# Patient Record
Sex: MALE | Race: WHITE | NOT HISPANIC OR LATINO | Employment: STUDENT | URBAN - NONMETROPOLITAN AREA
[De-identification: names, ages, dates, MRNs, and addresses within clinical notes are randomized per-mention and may not be internally consistent; named-entity substitution may affect disease eponyms.]

---
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
## 2018-11-22 ENCOUNTER — HOSPITAL ENCOUNTER (EMERGENCY)
Facility: HOSPITAL | Age: 18
Discharge: HOME | End: 2018-11-22
Attending: STUDENT IN AN ORGANIZED HEALTH CARE EDUCATION/TRAINING PROGRAM | Admitting: STUDENT IN AN ORGANIZED HEALTH CARE EDUCATION/TRAINING PROGRAM

## 2018-11-22 ENCOUNTER — HOSPITAL ENCOUNTER (EMERGENCY)
Facility: HOSPITAL | Age: 18
Discharge: HOME | End: 2018-11-22
Attending: EMERGENCY MEDICINE | Admitting: EMERGENCY MEDICINE

## 2018-11-22 VITALS
WEIGHT: 145 LBS | RESPIRATION RATE: 18 BRPM | OXYGEN SATURATION: 99 % | HEIGHT: 67 IN | BODY MASS INDEX: 22.76 KG/M2 | DIASTOLIC BLOOD PRESSURE: 93 MMHG | HEART RATE: 101 BPM | SYSTOLIC BLOOD PRESSURE: 137 MMHG | TEMPERATURE: 98.5 F

## 2018-11-22 VITALS
OXYGEN SATURATION: 98 % | TEMPERATURE: 99.8 F | HEIGHT: 67 IN | DIASTOLIC BLOOD PRESSURE: 82 MMHG | BODY MASS INDEX: 25.26 KG/M2 | RESPIRATION RATE: 16 BRPM | WEIGHT: 160.94 LBS | SYSTOLIC BLOOD PRESSURE: 132 MMHG | HEART RATE: 96 BPM

## 2018-11-22 DIAGNOSIS — L05.91 PILONIDAL CYST: Primary | ICD-10-CM

## 2018-11-22 DIAGNOSIS — L05.01 PILONIDAL CYST WITH ABSCESS: Primary | ICD-10-CM

## 2018-11-22 LAB
ANION GAP SERPL CALCULATED.3IONS-SCNC: 6 MMOL/L (ref 3–11)
BASOPHILS # BLD AUTO: 0.02 10*3/MM3 (ref 0–0.2)
BASOPHILS NFR BLD AUTO: 0.2 % (ref 0–1)
BUN BLD-MCNC: 9 MG/DL (ref 9–23)
BUN/CREAT SERPL: 12.2 (ref 7–25)
CALCIUM SPEC-SCNC: 9.2 MG/DL (ref 8.7–10.4)
CHLORIDE SERPL-SCNC: 105 MMOL/L (ref 99–109)
CO2 SERPL-SCNC: 27 MMOL/L (ref 20–31)
CREAT BLD-MCNC: 0.74 MG/DL (ref 0.6–1.3)
DEPRECATED RDW RBC AUTO: 42.8 FL (ref 37–54)
EOSINOPHIL # BLD AUTO: 0.06 10*3/MM3 (ref 0–0.3)
EOSINOPHIL NFR BLD AUTO: 0.7 % (ref 0–3)
ERYTHROCYTE [DISTWIDTH] IN BLOOD BY AUTOMATED COUNT: 13.8 % (ref 11.3–14.5)
GFR SERPL CREATININE-BSD FRML MDRD: 135 ML/MIN/1.73
GLUCOSE BLD-MCNC: 84 MG/DL (ref 70–100)
HCT VFR BLD AUTO: 44.4 % (ref 38.9–50.9)
HGB BLD-MCNC: 14.5 G/DL (ref 13.1–17.5)
IMM GRANULOCYTES # BLD: 0.03 10*3/MM3 (ref 0–0.03)
IMM GRANULOCYTES NFR BLD: 0.4 % (ref 0–0.6)
LYMPHOCYTES # BLD AUTO: 1.23 10*3/MM3 (ref 0.6–4.8)
LYMPHOCYTES NFR BLD AUTO: 15.1 % (ref 24–44)
MCH RBC QN AUTO: 27.8 PG (ref 27–31)
MCHC RBC AUTO-ENTMCNC: 32.7 G/DL (ref 32–36)
MCV RBC AUTO: 85.1 FL (ref 80–99)
MONOCYTES # BLD AUTO: 0.76 10*3/MM3 (ref 0–1)
MONOCYTES NFR BLD AUTO: 9.3 % (ref 0–12)
NEUTROPHILS # BLD AUTO: 6.06 10*3/MM3 (ref 1.5–8.3)
NEUTROPHILS NFR BLD AUTO: 74.7 % (ref 41–71)
PLATELET # BLD AUTO: 196 10*3/MM3 (ref 150–450)
PMV BLD AUTO: 11.8 FL (ref 6–12)
POTASSIUM BLD-SCNC: 3.5 MMOL/L (ref 3.5–5.5)
RBC # BLD AUTO: 5.22 10*6/MM3 (ref 4.2–5.76)
SODIUM BLD-SCNC: 138 MMOL/L (ref 132–146)
WBC NRBC COR # BLD: 8.13 10*3/MM3 (ref 4.5–13.5)

## 2018-11-22 PROCEDURE — 99283 EMERGENCY DEPT VISIT LOW MDM: CPT

## 2018-11-22 PROCEDURE — 25010000002 ERTAPENEM PER 500 MG: Performed by: PHYSICIAN ASSISTANT

## 2018-11-22 PROCEDURE — 80048 BASIC METABOLIC PNL TOTAL CA: CPT | Performed by: EMERGENCY MEDICINE

## 2018-11-22 PROCEDURE — 96365 THER/PROPH/DIAG IV INF INIT: CPT

## 2018-11-22 PROCEDURE — 25010000002 ONDANSETRON PER 1 MG: Performed by: PHYSICIAN ASSISTANT

## 2018-11-22 PROCEDURE — 85025 COMPLETE CBC W/AUTO DIFF WBC: CPT | Performed by: EMERGENCY MEDICINE

## 2018-11-22 PROCEDURE — 96375 TX/PRO/DX INJ NEW DRUG ADDON: CPT

## 2018-11-22 PROCEDURE — 25010000002 HYDROMORPHONE PER 4 MG: Performed by: EMERGENCY MEDICINE

## 2018-11-22 RX ORDER — ONDANSETRON 2 MG/ML
4 INJECTION INTRAMUSCULAR; INTRAVENOUS ONCE
Status: COMPLETED | OUTPATIENT
Start: 2018-11-22 | End: 2018-11-22

## 2018-11-22 RX ORDER — LIDOCAINE HYDROCHLORIDE AND EPINEPHRINE 10; 10 MG/ML; UG/ML
10 INJECTION, SOLUTION INFILTRATION; PERINEURAL ONCE
Status: COMPLETED | OUTPATIENT
Start: 2018-11-22 | End: 2018-11-22

## 2018-11-22 RX ORDER — ONDANSETRON 4 MG/1
4 TABLET, FILM COATED ORAL EVERY 8 HOURS PRN
Qty: 20 TABLET | Refills: 0 | Status: SHIPPED | OUTPATIENT
Start: 2018-11-22

## 2018-11-22 RX ORDER — TRAMADOL HYDROCHLORIDE 50 MG/1
50 TABLET ORAL EVERY 6 HOURS PRN
Qty: 15 TABLET | Refills: 0 | Status: SHIPPED | OUTPATIENT
Start: 2018-11-22

## 2018-11-22 RX ORDER — CLINDAMYCIN HYDROCHLORIDE 300 MG/1
300 CAPSULE ORAL 3 TIMES DAILY
Qty: 21 CAPSULE | Refills: 0 | Status: SHIPPED | OUTPATIENT
Start: 2018-11-22

## 2018-11-22 RX ORDER — HYDROMORPHONE HYDROCHLORIDE 1 MG/ML
0.5 INJECTION, SOLUTION INTRAMUSCULAR; INTRAVENOUS; SUBCUTANEOUS ONCE
Status: COMPLETED | OUTPATIENT
Start: 2018-11-22 | End: 2018-11-22

## 2018-11-22 RX ORDER — SODIUM CHLORIDE 0.9 % (FLUSH) 0.9 %
10 SYRINGE (ML) INJECTION AS NEEDED
Status: DISCONTINUED | OUTPATIENT
Start: 2018-11-22 | End: 2018-11-23 | Stop reason: HOSPADM

## 2018-11-22 RX ADMIN — ONDANSETRON 4 MG: 2 INJECTION INTRAMUSCULAR; INTRAVENOUS at 21:29

## 2018-11-22 RX ADMIN — ERTAPENEM SODIUM 1 G: 1 INJECTION, POWDER, LYOPHILIZED, FOR SOLUTION INTRAMUSCULAR; INTRAVENOUS at 21:34

## 2018-11-22 RX ADMIN — HYDROMORPHONE HYDROCHLORIDE 0.5 MG: 1 INJECTION, SOLUTION INTRAMUSCULAR; INTRAVENOUS; SUBCUTANEOUS at 21:31

## 2018-11-22 RX ADMIN — LIDOCAINE HYDROCHLORIDE,EPINEPHRINE BITARTRATE 10 ML: 10; .01 INJECTION, SOLUTION INFILTRATION; PERINEURAL at 23:05

## 2018-11-23 NOTE — ED PROVIDER NOTES
Subjective   History of Present Illness  This is a 18 year old female who comes in today complaining of having a red area to his upper buttock. He reports symptoms started 3 days ago and have been getting sore. He denies any drainage.   Review of Systems   Constitutional: Negative.    HENT: Negative.    Eyes: Negative.    Respiratory: Negative.    Cardiovascular: Negative.    Gastrointestinal: Negative.    Endocrine: Negative.    Genitourinary: Negative.    Musculoskeletal: Negative.    Skin: Positive for wound.   Allergic/Immunologic: Negative.    Neurological: Negative.    Hematological: Negative.    Psychiatric/Behavioral: Negative.    All other systems reviewed and are negative.      History reviewed. No pertinent past medical history.    No Known Allergies    History reviewed. No pertinent surgical history.    History reviewed. No pertinent family history.    Social History     Socioeconomic History   • Marital status: Single     Spouse name: Not on file   • Number of children: Not on file   • Years of education: Not on file   • Highest education level: Not on file   Tobacco Use   • Smoking status: Current Every Day Smoker   Substance and Sexual Activity   • Alcohol use: Yes     Comment: socially   • Drug use: No   • Sexual activity: Defer           Objective   Physical Exam   Constitutional: He appears well-developed and well-nourished.   Skin:        Nursing note and vitals reviewed.  GEN: No acute distress  Head: Normocephalic, atraumatic  Eyes: Pupils equal round reactive to light  ENT: Posterior pharynx normal in appearance, oral mucosa is moist  Chest: Nontender to palpation  Cardiovascular: Regular rate  Lungs: Clear to auscultation bilaterally  Abdomen: Soft, nontender, nondistended, no peritoneal signs  Extremities: No edema, normal appearance  Neuro: GCS 15  Psych: Mood and affect are appropriate      Procedures           ED Course                  MDM      Final diagnoses:   Pilonidal cyst             Bonnie Soares, APRN  11/22/18 1905

## 2018-11-23 NOTE — DISCHARGE INSTRUCTIONS
Continue your clindamycin antibiotics as previously prescribed.  Recheck in 2 days, return immediately if any change or worsening of symptoms.

## 2018-11-23 NOTE — ED PROVIDER NOTES
Subjective   18-year-old male presents to emergency department with pilonidal cyst for 3 days.  Was seen at [facility] emergency department approximately 2 hours ago.  Prescribe clindamycin.  Patient states pain increasing, like to have it drained.  No fevers chills or sweats, has had history of prior flareups in the past but no history of I&D.  Past medical history is otherwise unremarkable.  No current medication, denies drug allergies.        Illness   Location:  Per HPI  Quality:  Per HPI  Severity:  Moderate  Onset quality:  Gradual  Duration:  3 days  Timing:  Constant  Progression:  Worsening  Chronicity:  New  Context:  Per HPI  Relieved by:  Per HPI  Worsened by:  Per HPI  Ineffective treatments:  Per HPI  Associated symptoms: fever    Associated symptoms: no abdominal pain, no chest pain, no cough, no nausea, no shortness of breath, no vomiting and no wheezing        Review of Systems   Constitutional: Positive for fever. Negative for activity change, appetite change and chills.   Respiratory: Negative for cough, shortness of breath and wheezing.    Cardiovascular: Negative for chest pain.   Gastrointestinal: Negative for abdominal pain, nausea and vomiting.   Skin: Positive for wound.        Pilonidal cyst per history of present illness.   All other systems reviewed and are negative.      No past medical history on file.    No Known Allergies    No past surgical history on file.    No family history on file.    Social History     Socioeconomic History   • Marital status: Single     Spouse name: Not on file   • Number of children: Not on file   • Years of education: Not on file   • Highest education level: Not on file   Tobacco Use   • Smoking status: Current Every Day Smoker   Substance and Sexual Activity   • Alcohol use: Yes     Comment: socially   • Drug use: No   • Sexual activity: Defer           Objective   Physical Exam   Constitutional: He is oriented to person, place, and time. He  appears well-developed and well-nourished. No distress.   He is awake alert and oriented, oral temp 99.8, heart rate 135.  He does not appear to be toxic or dehydrated.   HENT:   Head: Normocephalic and atraumatic.   Right Ear: External ear normal.   Left Ear: External ear normal.   Nose: Nose normal.   Mouth/Throat: Oropharynx is clear and moist. No oropharyngeal exudate.   Eyes: Conjunctivae and EOM are normal. Pupils are equal, round, and reactive to light. Right eye exhibits no discharge. Left eye exhibits no discharge. No scleral icterus.   Neck: Normal range of motion. Neck supple. No JVD present. No tracheal deviation present. No thyromegaly present.   Cardiovascular: Normal rate.   Pulmonary/Chest: Effort normal. No stridor. No respiratory distress.   Abdominal: Soft. He exhibits no distension.   Musculoskeletal: Normal range of motion. He exhibits no edema, tenderness or deformity.   Neurological: He is alert and oriented to person, place, and time. No cranial nerve deficit. He exhibits normal muscle tone. Coordination normal.   Skin: Skin is warm and dry. No rash noted. He is not diaphoretic. No erythema. No pallor.   To 4 cm presacral abscess with surrounding erythema induration and tenderness.  No draining.   Psychiatric: He has a normal mood and affect. His behavior is normal. Judgment and thought content normal.   Nursing note and vitals reviewed.      Incision & Drainage  Date/Time: 11/22/2018 10:15 PM  Performed by: Phuc Raymundo PA-C  Authorized by: Tony Snell MD     Consent:     Consent obtained:  Verbal    Consent given by:  Patient    Risks discussed:  Bleeding and incomplete drainage  Location:     Indications for incision and drainage: pilonidal cyst with abscess.    Size:  2 x 4 cm    Location: Presacral area.  Pre-procedure details:     Skin preparation:  Betadine  Anesthesia (see MAR for exact dosages):     Anesthesia method:  Local infiltration    Local anesthetic:   Lidocaine 1% WITH epi  Procedure type:     Complexity:  Simple  Procedure details:     Needle aspiration: no      Incision types:  Single straight    Incision depth:  Subcutaneous    Scalpel blade:  11    Wound management:  Probed and deloculated, irrigated with saline and extensive cleaning    Drainage:  Purulent    Drainage amount:  Copious    Wound treatment:  Drain placed    Packing materials:  1/4 in gauze  Post-procedure details:     Patient tolerance of procedure:  Tolerated well, no immediate complications               ED Course  ED Course as of Nov 22 2216   Thu Nov 22, 2018 2214 Patient much more comfortable following I&D.  We'll discharge to home with pain control, continue clindamycin as previous prescribed.  Recheck in 2 days for packing removal.  Return immediately if any change or worsening.  He verbalizes understanding and is agreeable to plan.  [TG]      ED Course User Index  [TG] Phuc Raymundo PA-C        .  Recent Results (from the past 24 hour(s))   CBC Auto Differential    Collection Time: 11/22/18  9:17 PM   Result Value Ref Range    WBC 8.13 4.50 - 13.50 10*3/mm3    RBC 5.22 4.20 - 5.76 10*6/mm3    Hemoglobin 14.5 13.1 - 17.5 g/dL    Hematocrit 44.4 38.9 - 50.9 %    MCV 85.1 80.0 - 99.0 fL    MCH 27.8 27.0 - 31.0 pg    MCHC 32.7 32.0 - 36.0 g/dL    RDW 13.8 11.3 - 14.5 %    RDW-SD 42.8 37.0 - 54.0 fl    MPV 11.8 6.0 - 12.0 fL    Platelets 196 150 - 450 10*3/mm3    Neutrophil % 74.7 (H) 41.0 - 71.0 %    Lymphocyte % 15.1 (L) 24.0 - 44.0 %    Monocyte % 9.3 0.0 - 12.0 %    Eosinophil % 0.7 0.0 - 3.0 %    Basophil % 0.2 0.0 - 1.0 %    Immature Grans % 0.4 0.0 - 0.6 %    Neutrophils, Absolute 6.06 1.50 - 8.30 10*3/mm3    Lymphocytes, Absolute 1.23 0.60 - 4.80 10*3/mm3    Monocytes, Absolute 0.76 0.00 - 1.00 10*3/mm3    Eosinophils, Absolute 0.06 0.00 - 0.30 10*3/mm3    Basophils, Absolute 0.02 0.00 - 0.20 10*3/mm3    Immature Grans, Absolute 0.03 0.00 - 0.03 10*3/mm3   Basic Metabolic  "Panel    Collection Time: 11/22/18  9:17 PM   Result Value Ref Range    Glucose 84 70 - 100 mg/dL    BUN 9 9 - 23 mg/dL    Creatinine 0.74 0.60 - 1.30 mg/dL    Sodium 138 132 - 146 mmol/L    Potassium 3.5 3.5 - 5.5 mmol/L    Chloride 105 99 - 109 mmol/L    CO2 27.0 20.0 - 31.0 mmol/L    Calcium 9.2 8.7 - 10.4 mg/dL    eGFR Non African Amer 135 >60 mL/min/1.73    BUN/Creatinine Ratio 12.2 7.0 - 25.0    Anion Gap 6.0 3.0 - 11.0 mmol/L     Note: In addition to lab results from this visit, the labs listed above may include labs taken at another facility or during a different encounter within the last 24 hours. Please correlate lab times with ED admission and discharge times for further clarification of the services performed during this visit.    No orders to display     Vitals:    11/22/18 2015 11/22/18 2043 11/22/18 2215   BP: 132/82     BP Location: Left arm     Patient Position: Sitting     Pulse: (!) 135  96   Resp: 16     Temp: 98.6 °F (37 °C) 99.8 °F (37.7 °C)    TempSrc: Oral Oral    SpO2: 98%     Weight: 73 kg (160 lb 15 oz)     Height: 170.2 cm (67\")       Medications   sodium chloride 0.9 % flush 10 mL (not administered)   lidocaine-EPINEPHrine (XYLOCAINE W/EPI) 1 %-1:142523 injection 10 mL (not administered)   ondansetron (ZOFRAN) injection 4 mg (4 mg Intravenous Given 11/22/18 2129)   ertapenem (INVanz) 1 g/100 mL 0.9% NS VTB (mbp) (1 g Intravenous New Bag 11/22/18 2134)   HYDROmorphone (DILAUDID) injection 0.5 mg (0.5 mg Intravenous Given 11/22/18 2131)     ECG/EMG Results (last 24 hours)     ** No results found for the last 24 hours. **                [facility]      Final diagnoses:   Pilonidal cyst with abscess            Phuc Raymundo PA-C  11/22/18 2216    "

## 2018-11-25 ENCOUNTER — HOSPITAL ENCOUNTER (EMERGENCY)
Facility: HOSPITAL | Age: 18
Discharge: HOME | End: 2018-11-25
Attending: EMERGENCY MEDICINE

## 2018-11-25 VITALS
HEIGHT: 68 IN | DIASTOLIC BLOOD PRESSURE: 81 MMHG | BODY MASS INDEX: 25.31 KG/M2 | TEMPERATURE: 97.5 F | RESPIRATION RATE: 16 BRPM | HEART RATE: 79 BPM | SYSTOLIC BLOOD PRESSURE: 127 MMHG | OXYGEN SATURATION: 99 % | WEIGHT: 167 LBS

## 2018-11-25 DIAGNOSIS — L05.91 PILONIDAL CYST: Primary | ICD-10-CM

## 2018-11-25 DIAGNOSIS — Z51.89 WOUND CHECK, ABSCESS: ICD-10-CM

## 2018-11-25 PROCEDURE — 99201: CPT

## 2018-11-26 NOTE — ED PROVIDER NOTES
Subjective   The patient is here after having abscess, pilonidal cyst incision and drainage done a few days ago here for wound recheck no fevers chills states he is feeling better no other systemic complaints        History provided by:  Patient      Review of Systems   Constitutional: Negative.  Negative for fever.   HENT: Negative.    Respiratory: Negative.    Cardiovascular: Negative.    Gastrointestinal: Negative.    Genitourinary: Negative.    Musculoskeletal: Negative.    Skin: Positive for wound.   All other systems reviewed and are negative.      History reviewed. No pertinent past medical history.    No Known Allergies    History reviewed. No pertinent surgical history.    History reviewed. No pertinent family history.    Social History     Socioeconomic History   • Marital status: Single     Spouse name: Not on file   • Number of children: Not on file   • Years of education: Not on file   • Highest education level: Not on file   Tobacco Use   • Smoking status: Current Every Day Smoker   • Smokeless tobacco: Never Used   Substance and Sexual Activity   • Alcohol use: Yes     Comment: socially   • Drug use: No   • Sexual activity: Defer           Objective   Physical Exam   Constitutional: He is oriented to person, place, and time. He appears well-developed and well-nourished.   Afebrile nontoxic no acute distress   HENT:   Head: Normocephalic and atraumatic.   Mouth/Throat: Oropharynx is clear and moist.   Eyes: EOM are normal. Pupils are equal, round, and reactive to light.   Cardiovascular: Normal rate and regular rhythm.   Pulmonary/Chest: Effort normal.   Abdominal: Soft.   Musculoskeletal: Normal range of motion.   Neurological: He is alert and oriented to person, place, and time. No cranial nerve deficit or sensory deficit. He exhibits normal muscle tone. Coordination normal.   Skin: Skin is warm and dry. Capillary refill takes less than 2 seconds.   Noted incision site overlying the pilonidal area...  No surrounding erythema no discharge.. Furthermore no fluctuance   Psychiatric: He has a normal mood and affect. His behavior is normal. Thought content normal.   Nursing note and vitals reviewed.      Procedures           ED Course  ED Course as of Nov 25 1930   Sun Nov 25, 2018 1928 Did do a dressing change for the patient with more 4 x 4's to be sent home with recommendation for follow-up Dr Guzman./..  Patient agreeable with plan  [SC]      ED Course User Index  [SC] Nilton Arriaga PA-C                  MDM  Number of Diagnoses or Management Options     Amount and/or Complexity of Data Reviewed  Review and summarize past medical records: yes    Risk of Complications, Morbidity, and/or Mortality  Presenting problems: low  Diagnostic procedures: low  Management options: low    Patient Progress  Patient progress: stable        Final diagnoses:   Pilonidal cyst   Wound check, abscess            Nilton Arriaga PA-C  11/25/18 1930